# Patient Record
(demographics unavailable — no encounter records)

---

## 2024-10-21 NOTE — PHYSICAL EXAM
[de-identified] : Left ankle Physical Examination:   General: Alert and oriented x3.  In no acute distress.  Pleasant in nature with a normal affect.  No apparent respiratory distress. Erythema, Warmth, Rubor: Negative Swelling: Negative   ROM: 1. Dorsiflexion: 10 degrees 2. Plantarflexion: 40 degrees 3. 10 degrees of subtalar motion 4. Inversion: 20 degrees 5. Eversion: 20 degrees   Tenderness to Palpation: 1. Lateral Malleolus: Negative 2. Medial Malleolus: Negative 3. Proximal Fibular Pain: Negative 4. Heel Pain: Negative 5. Cuboid: Negative 6. Navicular: Negative 7. Tibiotalar Joint: Negative 8. Subtalar Joint: Negative 9. Posterior Recess: Negative   Tendon Pain: 1. Achilles: + Pain with palpating the Achilles tendon mid substance with a nodule consistent with tendinosis.  Negative Hunt test. 2. Peroneals: Negative 3. Posterior Tibialis: Negative 4. Tibialis Anterior: Negative   Ligament Pain: 1. ATFL: Negative 2. CFL: Negative 3. PTFL: Negative 4. Deltoid Ligaments: Negative 5. Lis Franc Ligament: Negative   Stability: 1. Anterior Drawer: Negative 2. Posterior Drawer: Negative   Strength: 5/5 TA/GS/EHL   Pulses: 2+ DP/PT Pulses   Neuro: Intact motor and sensory   Additional Test: 1. Calcaneal Squeeze Test: Negative 2. Syndesmosis Squeeze Test: Negative 3. Hunt Test: Negative  [de-identified] : 3V of the left ankle were ordered, obtained and reviewed by me today, 10/21/2024, and revealed: No fractures present.  Small heel spur coming off the calcaneal tuberosity.

## 2024-10-21 NOTE — DISCUSSION/SUMMARY
[de-identified] : Because the patient is currently enrolled in physical therapy and is failing physical therapy, I do want to proceed with an MRI of the left ankle without contrast to evaluate the Achilles tendon for high-grade partial tearing versus tendinosis.  I want the patient to continue following the Achilles tendinosis protocol below.  All of his questions were answered about the x-rays.  Patient understood and agreed to the treatment course.  The patient is aware that the MRI needs authorization by the insurance company.  The patient is also aware that there are no guarantees that the insurance company will authorize the MRI.  Assessment: Left ankle Achilles tendinosis.   Plan: #1. Anti-inflammatories/Tylenol as needed for pain. #2. Home stretching program/physical therapy prescription given. #3. Dr. Young's insoles/gel heel cups. #4. Epsom Salt Baths daily. #5. Dorsal Night Splint.  Use as instructed/as directed.  #4. Follow post MRI. #5. All questions answered.  The patient understood the treatment plan above.

## 2024-10-21 NOTE — REASON FOR VISIT
[Initial Visit] : an initial visit for [Ankle Pain] : ankle pain [FreeTextEntry2] : Left Achilles pain

## 2024-10-21 NOTE — HISTORY OF PRESENT ILLNESS
[FreeTextEntry1] : 10/21/2024: BORIS SALGADO is a 53 year old male presenting to the office for an initial evaluation of left Achilles pain. He reports that the pain is chronic.  Patient has been performing physical therapy and the patient is concerned because he continues to have left Achilles tendon pain.  He is here for a second opinion.  He has no trauma associated with this pains.  Patient in regular sneakers, walking without assistance.  No other complaints.

## 2024-12-17 NOTE — REVIEW OF SYSTEMS
[Joint Pain] : joint pain [Joint Swelling] : no joint swelling [Joint Stiffness] : joint stiffness [Muscle Cramps] : no muscle cramps [Myalgia] : no myalgia [Negative] : Heme/Lymph [FreeTextEntry5] : per hpi

## 2024-12-17 NOTE — DISCUSSION/SUMMARY
[FreeTextEntry1] :   Patient presents for: evaluation in preventive cardiac clinic [facility] clinic     MAFLD (high risk enhancer) hyperlipidemia, mixed HLD obesity     Orders placed including imaging/meds: Patient has diffuse hepatic steatosis on CT calcium Has hit plataeu unable to lose weight despite lifestyle interventions including , dietary changes and increased activity. He has a high risk enhancer for ASCVD and would benefit greatly from weight loss medications, GLP-1, for longterm cardiovascular health given obesity as a secondary risk enhancer.  he has no history of thyroid cancer concerns in family or personal, UTI's, poor  hygiene, gastroparesis, pancreatitis.   lipid panel reviewed with patient, CMP reviewed, [facility] cardiac relationship reviewed discussed weight loss management options including nutritional improvement, exercise improvement. Patient gaining weight despite lifestyle modifications, was previously on ozempic and now gaining weight now that he is off despite adequate nutrition, . Recommend continuing weight loss medication given overall cardiovascular health, obesity and MASLD, hyperlipidemia as known co-morbid risk factors for ASCVD.    Patient warm and euvolemic. There is no decompensated HF, uncontrolled arrhythmias or significant valvular heart disease at present. EKG is non-ischemic. We went over the EKG in office today, all questions answered. Vitals reviewed.     I've asked the patient to keep a blood pressure journal. The patient should take BP in both arms twice daily and keep a log for the next 1 week. I've asked the patient to send a copy of the journal to the office or their PCP via portal so necessary medication adjustments can be made.     The patient is aware of alarm cardiac type symptoms, will call with questions or concerns and is aware to activate 911 and/or present to the closest emergency department if concerns.   Follow up: with me in 12 months or sooner as requested.     I strongly encouraged the patient to pursue the following preventative cardiology, lifestyle modifications which are necessary for long-term cardiovascular health. The following is recommended: Advised a mediterranean and/or plant predominant, high fiber, limited salt (ideal <2 g/day), low fat diet, stress reduction/psychosomatic management, sleep hygiene/RICARDO testing and healthy weight loss, annual influenza/preventive vaccines, medication + treatment adherence/compliance, high risk behavior mitigation (I.e. tobacco abstinence, alcohol abstinence, no binge drinking, recreational/illicit drug use abstinence), increased exercise activity aiming for 150 minutes per week of moderate physical activity as per AHA/ACC standard for long term cardiovascular risk reduction.  [EKG obtained to assist in diagnosis and management of assessed problem(s)] : EKG obtained to assist in diagnosis and management of assessed problem(s)

## 2024-12-17 NOTE — HISTORY OF PRESENT ILLNESS
[FreeTextEntry1] : 52 year old male who presents for [facility] cardiac evaluation.  excessive sweating now off ozempic last 3-4 months Denies chest pain/pressure, palpitations, irregular and/or rapid heart beat, SOB, MCGEE, syncope/near syncope, dizziness, orthopnea, PND, cough, edema, f/c, n/v/d, hematuria, or hematochezia. No chest pain works out with  multiple times weekly, very active, works in finance. Limits to no high risk behaviors has worked on nutrition with  Cardiac workup more than 10 years prior. presents for [facility] clinic evaluation. Had weight loss with ozempic and improved cholesterol numbers. follows rheumatology due to joint pains.  12/2024: weight loss with , adjustment of diet, and meal prepping has not made impact as required given MASLD which is high risk enhancer. Patient has reached plataue, stays very active. Denies chest pain/pressure, palpitations, irregular and/or rapid heart beat, SOB, MCGEE, syncope/near syncope, dizziness, orthopnea, PND, cough, edema, f/c, n/v/d, hematuria, or hematochezia. Denies claudication, PAD, venous changes, Erectile dysfunction, TIA/CVA history, dizziness, amaroux fugax, vision/sensory changes.

## 2024-12-17 NOTE — REASON FOR VISIT
[FreeTextEntry1] : 52 year old male who presents for [facility] cardiac evaluation.

## 2025-02-04 NOTE — REVIEW OF SYSTEMS
[Fever] : no fever [Chills] : no chills [Night Sweats] : no night sweats [Palpitations] : no palpitations [Lower Ext Edema] : no lower extremity edema [Shortness Of Breath] : no shortness of breath [Wheezing] : no wheezing [Dyspnea on Exertion] : not dyspnea on exertion [Abdominal Pain] : no abdominal pain [Nausea] : no nausea [Vomiting] : no vomiting [Dysuria] : no dysuria

## 2025-02-04 NOTE — HEALTH RISK ASSESSMENT
[0] : 2) Feeling down, depressed, or hopeless: Not at all (0) [PHQ-2 Negative - No further assessment needed] : PHQ-2 Negative - No further assessment needed [[id number]] : 0

## 2025-02-04 NOTE — ASSESSMENT
[FreeTextEntry1] : URI: Afebrile. lungs clear. Declined flu and covid test. Most likely viral. Start promethazine 6.25mg TID PRN, ipratropium 0.06% 2 sprays each nostril 2-3 times daily PRN.  Chest pain: Atypical pain. Is able to tolerate exercise. Recommended he follow-up with Dr Kuo for additional testing.

## 2025-02-04 NOTE — PHYSICAL EXAM
[No Acute Distress] : no acute distress [Normal Sclera/Conjunctiva] : normal sclera/conjunctiva [PERRL] : pupils equal round and reactive to light [EOMI] : extraocular movements intact [No Lymphadenopathy] : no lymphadenopathy [Supple] : supple [No Respiratory Distress] : no respiratory distress  [No Accessory Muscle Use] : no accessory muscle use [Clear to Auscultation] : lungs were clear to auscultation bilaterally [Normal Rate] : normal rate  [Regular Rhythm] : with a regular rhythm [Normal S1, S2] : normal S1 and S2 [Soft] : abdomen soft [Non Tender] : non-tender [Non-distended] : non-distended [Normal Bowel Sounds] : normal bowel sounds [de-identified] : PND

## 2025-02-04 NOTE — HISTORY OF PRESENT ILLNESS
[FreeTextEntry8] : 53M presents for 4 days of nasal congestion, cough. Denies fever, chills, dyspnea. Has tried dayquil which helps.  Also reports one month of sternal and right sternal border CP. Denies palpitations, decreased exercise tolerance. Does cardio regularly without pain. He follows with Dr Kuo and recent echo shows normal LV function. He has not been able to get additional testing due to insurance.

## 2025-07-25 NOTE — DISCUSSION/SUMMARY
[de-identified] : Due to persistence of symptoms and lack of response to PT x2 request auth for MRI

## 2025-07-25 NOTE — HISTORY OF PRESENT ILLNESS
[de-identified] : 07/25/2025: Patient states that he was kickboxing in 2020 and he kicked a pad and has had pain in his left Achilles since. Has seen Dr. Rosas in 10/2024 who said he had LT ankle Achilles tendinosis. Has pain constantly. Has done PT x2 with no relief, acupuncture gives minimal relief. WB in sneakers. Pain in radiating up to the calf and has lower back/glute pain on the LT side.

## 2025-07-25 NOTE — PHYSICAL EXAM
[Left] : left foot and ankle [4___] : plantar flexion 4[unfilled]/5 [2+] : dorsalis pedis pulse: 2+ [] : not able to perform single heel raise